# Patient Record
(demographics unavailable — no encounter records)

---

## 2024-12-31 NOTE — PHYSICAL EXAM
[Chaperone Present] : A chaperone was present in the examining room during all aspects of the physical examination [[id number]] : A chaperone was present during the pelvic exam. [Appropriately responsive] : appropriately responsive [Alert] : alert [No Acute Distress] : no acute distress [Oriented x3] : oriented x3

## 2024-12-31 NOTE — PHYSICAL EXAM
[Chaperone Present] : A chaperone was present in the examining room during all aspects of the physical examination [[id number]] : A chaperone was present during the pelvic exam. [Appropriately responsive] : appropriately responsive [Alert] : alert [No Acute Distress] : no acute distress [Oriented x3] : oriented x3

## 2025-01-07 NOTE — HISTORY OF PRESENT ILLNESS
[Patient reported PAP Smear was normal] : Patient reported PAP Smear was normal [N] : Patient does not use contraception [Y] : Positive pregnancy history [Menarche Age: ____] : age at menarche was [unfilled] [Currently Active] : currently active [Men] : men [No] : No [Mammogramdate] : 3/26/2024 [TextBox_19] : BR 1 [PapSmeardate] : 10/21/2024 [ColonoscopyDate] : 9/28/2022 [HPVDate] : 3/26/2024 [TextBox_78] : Pt report normal [LMPDate] : 12/11/2024 [PGHxTotal] : 3 [[facility] : [facility] [[facility]] : 3 [FreeTextEntry1] : 12/11/2024

## 2025-01-07 NOTE — PLAN
[FreeTextEntry1] : We discussed in detail her PMDD and perimenopausal symptoms and how they affect her daily quality of life. We reviewed the risks of untreated vasomotor symptoms and diminished sleep as a cardiac risk factor. Her medical history was reviewed in detail. There are no contraindications to menopausal hormone therapy. We discussed systemic menopause hormone therapy, as well as nonhormonal treatment options for her menopausal symptoms, although she is on and has tried many nonhormonal medications.  She is aware that systemic menopausal hormone therapy is FDA approved and is recommended for women within 10 years of menopause and less than 60 years old with vasomotor symptoms and other menopausal symptoms.  Given the fact that she has a uterus, we discussed the need for both estrogen and progesterone therapy. By taking progesterone as well, this will reduce her risk of endometrial hyperplasia and endometrial cancer. We discussed the rare risks of menopausal hormone therapy including a rare increase in breast cancer (approx 1 case per 1000), rare increase in cardiovascular disease (approx 2.5 cases per 1000), rare increase in stroke (approx 2.5 cases per 1000), and a rare increase in pulmonary embolism (approx 3 cases per 1000). I also explained that there is actually a small reduction of all cause mortality overall (approx 5 fewer cases per 1000).  Side effects may be encountered for the first 3-6 months of systemic menopausal hormone therapy initiation. She is aware of the potential side effects including breast tenderness, mood changes, bloating, and abnormal vaginal bleeding. She is aware that if she has any abnormal bleeding that persists after 3-6 months, she must return for a uterine evaluation.  We discussed the different formulations of hormones and routes of estrogen delivery via transdermal routes (patches, gels, sprays), transvaginal formulations via a Femring and oral routes. We discussed the benefits and risks of each different routes individually. We also discussed the different routes of progesterone delivery including transdermal via patch, oral or intrauterine routes (Mirena or Kyleen IUD).  After an extensive discussion, the patient has concluded that the benefits of systemic menopausal hormone therapy outweigh the risks and through a shared decision-making process, the patient has decided to initiate menopausal hormone therapy. My recommendation is to start with transdermal or transvaginal estrogen routes as there may likely be less risks than oral therapy. She is aware it can take 3 months to have relief of symptoms. She was instructed to follow up in the office in 3-4 months. Prescription sent for an estrogen patch weekly 0.075 mg for the patient to use daily with the progesterone 100 mg which she will use nightly.   We also discussed the diagnosis of hypoactive sexual desire disorder. In her case, she does not have vaginal symptoms or pain as a cause for her decreased libido. We discussed the 3 treatment options for decreased libido in women, Flibanserin (Addyi) , Bremelanotide (Vyleesi), and testosterone therapy. I explained that both Flibanserin and Bremelanotide are FDA approved for use in premenopausal women and not postmenopausal women. If we used either of these in a postmenopausal woman, it would be off label.  I explained the risks, benefits, side effects and cost of the above. She has decided to pursue a trial of testosterone therapy. We discussed the need for baseline testosterone levels, in addition to CBC, cholesterol panel, and LFTs. I explained that testosterone therapy is not FDA approved for women and it is used off label. Since there are no FDA approved testosterone products for women, my preference is to use a compounded pharmacy. We discussed that it helps 50% of women. We discussed significant potential side effects that could be irreversible including acne, hair loss, voice changes. She will return in 3 months for repeat testosterone levels. Rx sent to [facility].  Questions answered.  Pt will follow up annually and as needed.   Pt's testosterone levels will be checked.   During this visit 60 minutes were spent face-to-face with greater than 50% of the time dedicated to discussing her perimenopausal /menopausal symptoms and treatment options.

## 2025-01-07 NOTE — REVIEW OF SYSTEMS
[Negative] : Breast [Fatigue] : fatigue [Night Sweats] : night sweats [Abn Vaginal bleeding] : no abnormal vaginal bleeding [Genital Rash/Irritation] : no genital rash/irritation [Anxiety] : anxiety [Sleep Disturbances] : sleep disturbances [Decreased Libido] : decreased libido [PMS/PMDD Symptoms] : PMS/PMDD symptoms

## 2025-01-07 NOTE — PLAN
[FreeTextEntry1] : We discussed in detail her PMDD and perimenopausal symptoms and how they affect her daily quality of life. We reviewed the risks of untreated vasomotor symptoms and diminished sleep as a cardiac risk factor. Her medical history was reviewed in detail. There are no contraindications to menopausal hormone therapy. We discussed systemic menopause hormone therapy, as well as nonhormonal treatment options for her menopausal symptoms, although she is on and has tried many nonhormonal medications.  She is aware that systemic menopausal hormone therapy is FDA approved and is recommended for women within 10 years of menopause and less than 60 years old with vasomotor symptoms and other menopausal symptoms.  Given the fact that she has a uterus, we discussed the need for both estrogen and progesterone therapy. By taking progesterone as well, this will reduce her risk of endometrial hyperplasia and endometrial cancer. We discussed the rare risks of menopausal hormone therapy including a rare increase in breast cancer (approx 1 case per 1000), rare increase in cardiovascular disease (approx 2.5 cases per 1000), rare increase in stroke (approx 2.5 cases per 1000), and a rare increase in pulmonary embolism (approx 3 cases per 1000). I also explained that there is actually a small reduction of all cause mortality overall (approx 5 fewer cases per 1000).  Side effects may be encountered for the first 3-6 months of systemic menopausal hormone therapy initiation. She is aware of the potential side effects including breast tenderness, mood changes, bloating, and abnormal vaginal bleeding. She is aware that if she has any abnormal bleeding that persists after 3-6 months, she must return for a uterine evaluation.  We discussed the different formulations of hormones and routes of estrogen delivery via transdermal routes (patches, gels, sprays), transvaginal formulations via a Femring and oral routes. We discussed the benefits and risks of each different routes individually. We also discussed the different routes of progesterone delivery including transdermal via patch, oral or intrauterine routes (Mirena or Kyleen IUD).  After an extensive discussion, the patient has concluded that the benefits of systemic menopausal hormone therapy outweigh the risks and through a shared decision-making process, the patient has decided to initiate menopausal hormone therapy. My recommendation is to start with transdermal or transvaginal estrogen routes as there may likely be less risks than oral therapy. She is aware it can take 3 months to have relief of symptoms. She was instructed to follow up in the office in 3-4 months. Prescription sent for an estrogen patch weekly 0.075 mg for the patient to use daily with the progesterone 100 mg which she will use nightly.   We also discussed the diagnosis of hypoactive sexual desire disorder. In her case, she does not have vaginal symptoms or pain as a cause for her decreased libido. We discussed the 3 treatment options for decreased libido in women, Flibanserin (Addyi) , Bremelanotide (Vyleesi), and testosterone therapy. I explained that both Flibanserin and Bremelanotide are FDA approved for use in premenopausal women and not postmenopausal women. If we used either of these in a postmenopausal woman, it would be off label.  I explained the risks, benefits, side effects and cost of the above. She has decided to pursue a trial of testosterone therapy. We discussed the need for baseline testosterone levels, in addition to CBC, cholesterol panel, and LFTs. I explained that testosterone therapy is not FDA approved for women and it is used off label. Since there are no FDA approved testosterone products for women, my preference is to use a compounded pharmacy. We discussed that it helps 50% of women. We discussed significant potential side effects that could be irreversible including acne, hair loss, voice changes. She will return in 3 months for repeat testosterone levels. Rx sent to [facility].  Questions answered.  Pt will follow up annually and as needed.   Pt's testosterone levels will be checked.   During this visit 60 minutes were spent face-to-face with greater than 50% of the time dedicated to discussing her perimenopausal /menopausal symptoms and treatment options.

## 2025-01-07 NOTE — HISTORY OF PRESENT ILLNESS
[Patient reported PAP Smear was normal] : Patient reported PAP Smear was normal [N] : Patient does not use contraception [Y] : Positive pregnancy history [Menarche Age: ____] : age at menarche was [unfilled] [Currently Active] : currently active [Men] : men [No] : No [Mammogramdate] : 3/26/2024 [TextBox_19] : BR 1 [PapSmeardate] : 10/21/2024 [ColonoscopyDate] : 9/28/2022 [HPVDate] : 3/26/2024 [TextBox_78] : Pt report normal [LMPDate] : 12/11/2024 [PGHxTotal] : 3 [[facility]] : 3 [[facility] Center[facility]] : 3 [FreeTextEntry1] : 12/11/2024

## 2025-01-07 NOTE — END OF VISIT
[FreeTextEntry3] : "I, Ramiro Diaz, personally scribed the services dictated to me by Dr. Lexus Savage MD in this documentation on 12/31/2024"   "I, Dr. Lexus Savage MD, personally performed the services described in this documentation on 12/31/2024 for the patient as scribed by Ramiro Diaz in my presence. I have reviewed and verified that all the information is accurate and true." [Time Spent: ___ minutes] : I have spent [unfilled] minutes of time on the encounter which excludes teaching and separately reported services.

## 2025-03-06 NOTE — CONSULT LETTER
[Dear  ___] : Dear  [unfilled], [Consult Letter:] : I had the pleasure of evaluating your patient, [unfilled]. [Please see my note below.] : Please see my note below. [Sincerely,] : Sincerely, [FreeTextEntry3] : Dr. Rodger Shipman

## 2025-03-06 NOTE — PROCEDURE
[de-identified] : Injection: US guided Left Elbow Indication: Lateral epicondylitis  A discussion was had with the patient regarding this procedure and all questions were answered. All risks, benefits, and alternatives were discussed. These included but were not limited to bleeding, infection, and allergic reaction. Alcohol was used to clean the skin, and ChloraPrep was used to sterilize and prep the area in the lateral aspect of the left elbow. Ethyl chloride spray was then used as a topical anesthetic. A 25-gauge needle was used to inject 1 cc of 0.25% bupivacaine, 1 cc of 1% xylocaine, and 1 cc of 40 mg/mL triamcinolone acetonide into the extensor tendon origin. A gentle needling procedure was performed. Ultrasound guidance was utilized for localization of the needle. A sterile band aid was then applied. The patient tolerated the procedure well and there were no complications. Post injection instructions were given.

## 2025-03-06 NOTE — DISCUSSION/SUMMARY
[de-identified] : We had a thorough discussion regarding the nature of her pain, the pathophysiology, as well as all treatment options. I discussed operative and non-operative treatment modalities. Patients presentation is consistent with lateral epicondylitis. Pt due to her acute pain elected for a corticosteroid injection at today's visit and tolerated the procedure well. She should take it easy for the next 2-3 days while icing the joint. A prescription of Meloxicam was given to be taken as directed with food to prevent GI upset, if occurs pt to D/C and call us at that time. All questions were answered and the patient verbalized understanding. The patient is in agreement with this treatment plan. Patient will follow up in 6-8 wks for repeat clinical assessment.

## 2025-03-06 NOTE — ADDENDUM
[FreeTextEntry1] : Documented by Montse Hodge acting as a scribe for Dr. Shipman and Shayne Hopkins PA-C on 03/06/2025. All medical record entries made by the Scribe were at my, Dr. Shipman, and Shayne Hopkins's, direction and personally dictated by me on 03/06/2025. I have reviewed the chart and agree that the record accurately reflects my personal performance of the history, physical exam, procedure and imaging.

## 2025-03-06 NOTE — PROCEDURE
[de-identified] : Injection: US guided Left Elbow Indication: Lateral epicondylitis  A discussion was had with the patient regarding this procedure and all questions were answered. All risks, benefits, and alternatives were discussed. These included but were not limited to bleeding, infection, and allergic reaction. Alcohol was used to clean the skin, and ChloraPrep was used to sterilize and prep the area in the lateral aspect of the left elbow. Ethyl chloride spray was then used as a topical anesthetic. A 25-gauge needle was used to inject 1 cc of 0.25% bupivacaine, 1 cc of 1% xylocaine, and 1 cc of 40 mg/mL triamcinolone acetonide into the extensor tendon origin. A gentle needling procedure was performed. Ultrasound guidance was utilized for localization of the needle. A sterile band aid was then applied. The patient tolerated the procedure well and there were no complications. Post injection instructions were given.

## 2025-03-06 NOTE — HISTORY OF PRESENT ILLNESS
[de-identified] : JORDAN is a 48 year female here today for initial visit due to L elbow and L shoulder. States it has been causing her pain for about two years. She was doing a lateral raise in Oct 2024 and felt a pull. She ignored it for a while and modified. She then tried lifting lighter weights and using a tens unit and also acupuncture without much relief. She stopped lifting weights about three weeks ago and could no longer  or grab without pain. She is also having difficulty with ADLs. She has had to stop carrying groceries in her L hand due to her pain. She has been utilizing Meloxicam with some relief to her symptoms.

## 2025-03-06 NOTE — DISCUSSION/SUMMARY
[de-identified] : We had a thorough discussion regarding the nature of her pain, the pathophysiology, as well as all treatment options. I discussed operative and non-operative treatment modalities. Patients presentation is consistent with lateral epicondylitis. Pt due to her acute pain elected for a corticosteroid injection at today's visit and tolerated the procedure well. She should take it easy for the next 2-3 days while icing the joint. A prescription of Meloxicam was given to be taken as directed with food to prevent GI upset, if occurs pt to D/C and call us at that time. All questions were answered and the patient verbalized understanding. The patient is in agreement with this treatment plan. Patient will follow up in 6-8 wks for repeat clinical assessment.

## 2025-03-06 NOTE — PHYSICAL EXAM
[de-identified] : General: Well appearing, no acute distress Neurologic: A&Ox3, No focal deficits Head: NCAT without abrasions, lacerations, or ecchymosis to head, face, or scalp Eyes: No scleral icterus, no gross abnormalities Respiratory: Equal chest wall expansion bilaterally, no accessory muscle use Lymphatic: No lymphadenopathy palpated Skin: Warm and dry Psychiatric: Normal mood and affect  Left elbow shows no obvious deformity swelling or ecchymosis. There is tenderness to palpation over the lateral epicondyle. No tenderness over the medial epicondyle. Elbow ROM 0-120 with pain. Full ROM with pronation and supination with pain. Pain upon resisted elbow supination. No instability to valgus or varus stress in the elbow. Pain with resisted wrist extension with simultaneous elbow extension. Negative Tinel sign at the cubital tunnel and carpal tunnel. Compartments soft and nontender. Sensation intact distally. 2+ cap refill.  Examination of the left shoulder reveals no obvious deformity.  There is no evidence of muscle atrophy.  The skin is intact.  There is no swelling, erythema, or ecchymosis.  The patient is nontender to palpation throughout the left shoulder including the anterior aspect of the shoulder near the bicipital groove, a.c. joint, trapezius and posterior shoulder.  The patient's active range of motion is as follows: Forward flexion to 175 degrees external rotation to 75 degrees and internal rotation to an upper lumbar level.  The patient has 5 out of 5 strength to forward flexion with pronation, internal and external rotation. 5/5 strength to abduction with pain.  The patient has POS Robledo and Neer's tests.  There is no pain with cross body abduction testing.  There is a negative a.c. compression test and no sulcus sign present.  There is a negative liftoff test and negative yergason test.  The compartments of the arm are soft and nontender without evidence of DVT or compartment syndrome.  The patient is grossly neurovascularly intact distally. [de-identified] : The following radiographs were ordered and read by me during this patient's visit. I reviewed each radiograph in detail with the patient and discussed the findings as highlighted below. 3 views of the left elbow and 4 views of the left shoulder were obtained today that show no fracture, dislocation. There is no degenerative change seen. There is no malalignment. No obvious osseous abnormality. Otherwise unremarkable.

## 2025-03-06 NOTE — PHYSICAL EXAM
[de-identified] : General: Well appearing, no acute distress Neurologic: A&Ox3, No focal deficits Head: NCAT without abrasions, lacerations, or ecchymosis to head, face, or scalp Eyes: No scleral icterus, no gross abnormalities Respiratory: Equal chest wall expansion bilaterally, no accessory muscle use Lymphatic: No lymphadenopathy palpated Skin: Warm and dry Psychiatric: Normal mood and affect  Left elbow shows no obvious deformity swelling or ecchymosis. There is tenderness to palpation over the lateral epicondyle. No tenderness over the medial epicondyle. Elbow ROM 0-120 with pain. Full ROM with pronation and supination with pain. Pain upon resisted elbow supination. No instability to valgus or varus stress in the elbow. Pain with resisted wrist extension with simultaneous elbow extension. Negative Tinel sign at the cubital tunnel and carpal tunnel. Compartments soft and nontender. Sensation intact distally. 2+ cap refill.  Examination of the left shoulder reveals no obvious deformity.  There is no evidence of muscle atrophy.  The skin is intact.  There is no swelling, erythema, or ecchymosis.  The patient is nontender to palpation throughout the left shoulder including the anterior aspect of the shoulder near the bicipital groove, a.c. joint, trapezius and posterior shoulder.  The patient's active range of motion is as follows: Forward flexion to 175 degrees external rotation to 75 degrees and internal rotation to an upper lumbar level.  The patient has 5 out of 5 strength to forward flexion with pronation, internal and external rotation. 5/5 strength to abduction with pain.  The patient has POS Robledo and Neer's tests.  There is no pain with cross body abduction testing.  There is a negative a.c. compression test and no sulcus sign present.  There is a negative liftoff test and negative yergason test.  The compartments of the arm are soft and nontender without evidence of DVT or compartment syndrome.  The patient is grossly neurovascularly intact distally. [de-identified] : The following radiographs were ordered and read by me during this patient's visit. I reviewed each radiograph in detail with the patient and discussed the findings as highlighted below. 3 views of the left elbow and 4 views of the left shoulder were obtained today that show no fracture, dislocation. There is no degenerative change seen. There is no malalignment. No obvious osseous abnormality. Otherwise unremarkable.

## 2025-03-06 NOTE — HISTORY OF PRESENT ILLNESS
[de-identified] : JORDAN is a 48 year female here today for initial visit due to L elbow and L shoulder. States it has been causing her pain for about two years. She was doing a lateral raise in Oct 2024 and felt a pull. She ignored it for a while and modified. She then tried lifting lighter weights and using a tens unit and also acupuncture without much relief. She stopped lifting weights about three weeks ago and could no longer  or grab without pain. She is also having difficulty with ADLs. She has had to stop carrying groceries in her L hand due to her pain. She has been utilizing Meloxicam with some relief to her symptoms.

## 2025-04-09 NOTE — PLAN
[FreeTextEntry1] : Pt is doing well on 0.075 MG estradiol weekly and 100 MG of oral progesterone nightly. Continue as prescribed. Prescription renewals for transdermal estradiol 0.075 MG & 100 MG of oral progesterone.   Pt is doing well on testosterone therapy. Will recheck level on today. If level is stable today, we repeat level in two months. Prescription renewal for testosterone given.   F/u annually or as needed.

## 2025-04-09 NOTE — END OF VISIT
[FreeTextEntry3] : I, Keira Fischer solely acted as scribe for Dr. Lexus Savage on 04/07/2025  All medical entries made by the Scribe were at my, Dr. Olivera, direction and personally dictated by me on 04/07/2025 . I have reviewed the chart and agree that the record accurately reflects my personal performance of the history, physical exam, assessment and plan. I have also personally directed, reviewed, and agreed with the chart.

## 2025-04-09 NOTE — REVIEW OF SYSTEMS
[Night Sweats] : no night sweats [Sleep Disturbances] : no sleep disturbances [Decreased Libido] : libido not decreased [Hot Flashes] : no hot flashes

## 2025-04-09 NOTE — HISTORY OF PRESENT ILLNESS
[Mammogramdate] : 04/01/2025 [TextBox_19] : bilateral br1 [PapSmeardate] : 05/21/2021 [TextBox_31] : neg  [BoneDensityDate] : n/a [ColonoscopyDate] : 09/28/2022 [TextBox_43] : 2 polyps found, internal hemorrhoid  [GonorrheaDate] : n/a [ChlamydiaDate] : n/a [HPVDate] : 12/02/2014 [TextBox_78] : neg  [LMPDate] : 04/06/2025 [PGHxTotal] : 3 [[facility]] : 3 [[facility] Corporationiving] : 3 [FreeTextEntry1] : 04/06/2025 [FreeTextEntry2] :

## 2025-04-09 NOTE — HISTORY OF PRESENT ILLNESS
[Mammogramdate] : 04/01/2025 [TextBox_19] : bilateral br1 [PapSmeardate] : 05/21/2021 [TextBox_31] : neg  [BoneDensityDate] : n/a [ColonoscopyDate] : 09/28/2022 [TextBox_43] : 2 polyps found, internal hemorrhoid  [GonorrheaDate] : n/a [ChlamydiaDate] : n/a [HPVDate] : 12/02/2014 [TextBox_78] : neg  [LMPDate] : 04/06/2025 [PGHxTotal] : 3 [[facility] CenterlTerm] : 3 [[facility] Centeriving] : 3 [FreeTextEntry1] : 04/06/2025 [FreeTextEntry2] :

## 2025-05-19 NOTE — HISTORY OF PRESENT ILLNESS
[FreeTextEntry1] : Post ER follow-up [de-identified] : Follow-up after patient presented to the ER at [facility] on May 15, 2025 complaining of dizziness. It was intermittent for 4 days with heaviness in her head and feeling like she was going to pass out. Worsening over the 4 days. Worsened to the point where she could not work therefore came to the ER. Exam showed no focal deficits. Workup done CT scan of the brain with no acute changes. CTA of the neck and brain showed no evidence of stenoses, dissection or occlusions. CT cervical spine with no acute changes with disc bulges noted at several levels. Patient felt to have vertigo and given meclizine with improvement. Stable for discharge with meclizine prescription.  The patient now relates that she did not recall at that time but later recalled when she got home that she had a fairly significant head trauma on a cabinet on May 8 which caused her to almost feel like she was going to pass out and unsteady which lasted a few minutes but then went back to her job.  After which she did not feel well for the next few days and then the symptoms started.  She continues to have symptoms including dizziness, decreased focus, dizziness when on the computer for more than 10 to 15 minutes, dizziness when using an elevator and mild chronic headache. 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

## 2025-05-19 NOTE — ASSESSMENT
[FreeTextEntry1] : Patient was status post head trauma with resultant persistent symptoms including dizziness, decreased ability to focus, dizziness with being on a computer, mild chronic headache. Patient likely has concussive symptoms. Recommended avoiding any stresses in things that exacerbate her symptoms.  Rest is much as possible.  No strenuous activity or exercises. Referral given for neurology.

## 2025-05-19 NOTE — HISTORY OF PRESENT ILLNESS
[FreeTextEntry1] : Post ER follow-up [de-identified] : Follow-up after patient presented to the ER at [facility] on May 15, 2025 complaining of dizziness. It was intermittent for 4 days with heaviness in her head and feeling like she was going to pass out. Worsening over the 4 days. Worsened to the point where she could not work therefore came to the ER. Exam showed no focal deficits. Workup done CT scan of the brain with no acute changes. CTA of the neck and brain showed no evidence of stenoses, dissection or occlusions. CT cervical spine with no acute changes with disc bulges noted at several levels. Patient felt to have vertigo and given meclizine with improvement. Stable for discharge with meclizine prescription.  The patient now relates that she did not recall at that time but later recalled when she got home that she had a fairly significant head trauma on a cabinet on May 8 which caused her to almost feel like she was going to pass out and unsteady which lasted a few minutes but then went back to her job.  After which she did not feel well for the next few days and then the symptoms started.  She continues to have symptoms including dizziness, decreased focus, dizziness when on the computer for more than 10 to 15 minutes, dizziness when using an elevator and mild chronic headache. 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

## 2025-05-19 NOTE — PHYSICAL EXAM
[No Acute Distress] : no acute distress [No Respiratory Distress] : no respiratory distress  [No Accessory Muscle Use] : no accessory muscle use [Clear to Auscultation] : lungs were clear to auscultation bilaterally [Normal Rate] : normal rate  [Regular Rhythm] : with a regular rhythm [Coordination Grossly Intact] : coordination grossly intact [No Focal Deficits] : no focal deficits [Normal Gait] : normal gait [Speech Grossly Normal] : speech grossly normal [Memory Grossly Normal] : memory grossly normal [Normal Affect] : the affect was normal [Alert and Oriented x3] : oriented to person, place, and time [Normal Mood] : the mood was normal

## 2025-05-23 NOTE — REVIEW OF SYSTEMS
[Dizziness] : dizziness [Lightheadedness] : lightheadedness [Cluster Headache] : cluster headaches [Limb Pain] : limb pain [Negative] : Endocrine

## 2025-05-23 NOTE — PHYSICAL EXAM
[General Appearance - Alert] : alert [General Appearance - Well Nourished] : well nourished [General Appearance - Well-Appearing] : healthy appearing [Oriented To Time, Place, And Person] : oriented to person, place, and time [Affect] : the affect was normal [Person] : oriented to person [Place] : oriented to place [Time] : oriented to time [Short Term Intact] : short term memory intact [Cranial Nerves Optic (II)] : visual acuity intact bilaterally,  pupils equal round and reactive to light [Cranial Nerves Oculomotor (III)] : extraocular motion intact [Cranial Nerves Trigeminal (V)] : facial sensation intact symmetrically [Cranial Nerves Facial (VII)] : face symmetrical [Cranial Nerves Vestibulocochlear (VIII)] : hearing was intact bilaterally [Cranial Nerves Accessory (XI - Cranial And Spinal)] : head turning and shoulder shrug symmetric [Motor Tone] : muscle tone was normal in all four extremities [Motor Strength] : muscle strength was normal in all four extremities [Sensation Tactile Decrease] : light touch was intact [Abnormal Walk] : normal gait [Sclera] : the sclera and conjunctiva were normal [PERRL With Normal Accommodation] : pupils were equal in size, round, reactive to light, with normal accommodation [Outer Ear] : the ears and nose were normal in appearance [Neck Appearance] : the appearance of the neck was normal [] : no respiratory distress [Heart Rate And Rhythm] : heart rate was normal and rhythm regular [Abdomen Soft] : soft [No Spinal Tenderness] : no spinal tenderness [Involuntary Movements] : no involuntary movements were seen [FreeTextEntry8] : no drift, no imbalance at tandem

## 2025-05-23 NOTE — RESULTS/DATA
[FreeTextEntry1] : ACC: [id number] EXAM: CT ANGIO NECK (W)AW IC ORDERED BY: ISA YING  ACC: [id number] EXAM: CT BRAIN ORDERED BY: ISA YING  ACC: [id number] EXAM: CT CERVICAL SPINE ORDERED BY: ISA YING  ACC: [id number] EXAM: CT ANGIO BRAIN (W)AW IC ORDERED BY: ISA YING  PROCEDURE DATE: 05/15/2025    INTERPRETATION: INDICATION: Dizziness  TECHNIQUE: A thin section CT study of the head and neck was conducted during rapid infusion of intravenous contrast (power injected). In addition to the axial data, reformatted images were generated using a 3D workstation. Rapid AI was used to screen for hemorrhage. 100 cc Omnipaque 350 was administered.  FINDINGS:  AORTIC ARCH no dissection or aneurysmal dilatation is noted. Major vessel origins are patent. The subclavian arteries are well visualized and appear to be patent bilaterally. COMMON CAROTID ARTERIES: Laterally widely patent RIGHT BIFURCATION: Widely patent LEFT BIFURCATION: Widely patent INTERNAL CAROTID ARTERIES: Bilaterally widely patent VERTEBRALS bilaterally well developed and widely patent with left-sided dominance. No ostial lesion noted. MISCELLANEOUS: Prevertebral soft tissues are unremarkable.   BRAIN both hemispheres appear to be symmetric with no CT evidence of an acute infarct, hemorrhage, or space-occupying lesion. No hydrocephalus or collections noted. Brainstem and cerebellum are unremarkable. Sinuses and mastoids are clear.  The petrous, cavernous, and supraclinoid carotid arteries are bilaterally patent   Both anterior cerebral arteries are patent. Both A1 segments are well formed.  Both middle cerebral arteries are patent. No M1 lesion or distal branch occlusion suggested.  Posterior cerebral arteries are patent.  Vertebrobasilar system is widely patent.  The venous sinuses are patent.  CERVICAL FINDINGS:  ALIGNMENT: Mild straightening is noted. VERTEBRAL BODIES: Appear to be intact. No fracture or compression deformity noted. No destructive lesion identified. DISC SPACES: Result in height. C1-2 : Unremarkable C2-3: Unremarkable C3-4: Mild bulging is noted C4-5: Unremarkable C5-6: Mild bulging is noted more prevalent to the left C6-7: A a central bulge is noted more prevalent to the left C7-T1: Unremarkable CANAL: Widely patent FORAMINA: Bilaterally widely patent. MISCELLANEOUS: Upper thoracic canal is grossly unremarkable.  IMPRESSION:  1. Unremarkable CT study of the brain. No acute infarct, hemorrhage, or space-occupying lesion. No posterior fossa abnormality. Clear sinuses and mastoids. 2. Unremarkable appearance of the aortic arch, major vessels, and neck vasculature. No significant stenosis, dissection, or occlusion. Well-developed vertebral arteries bilaterally. 3. Widely patent vasculature noted intracranially. Widely patent vertebrobasilar system. 4. Mild cervical straightening. No vertebral body fracture or acute disc herniation identified. Disc bulges are noted at several levels, as outlined above. No central or foraminal stenosis noted.

## 2025-05-23 NOTE — HISTORY OF PRESENT ILLNESS
[FreeTextEntry1] : Ms Burgos is a 48 years old female with no PMH of who is here for consultation regarding a concussion. On  5/8/25 she hit her head to a cabinet at work. 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 No LOC noted. Pt was disoriented for some time. Pt was continued to have headache from the time. Pt was seen in the ER and was seen in 5/15/25 and had many imaging. She was treated conservatively and discharged home with instruction to follow up Trauma/ concussion clinic.   Today she presented to clinic with mild headache and fogginess. She feels disoriented most of the time. He denies HA, vision changes, nausea, vomiting or seizures. Pt went back to work ,but had to leave the work couple time with worsening headache. Pt feels very fatigued and disoriented at work. She takes Magnesium blend and Vitamins for suzanna menopausal symptoms. Pt also walks 5-6 miles a day.  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

## 2025-05-23 NOTE — ASSESSMENT
[Indicate if, in your opinion, the incident that the patient described was the competent medical cause of this injury/illness.] : The incident that the patient described was the competent medical cause of this injury/illness: Yes [Indicate if the patient's complaints are consistent with his/her history of the injury/illness.] : Indicate if the patient's complaints are consistent with his/her history of the injury/illness: Yes [FreeTextEntry5] : 100% [Physical Disability Temporary Total] : temporarily total disabled [FreeTextEntry1] : IMPRESSION  48 years old female with no PMH of who is here for consultation regarding a concussion. On 5/8/25 she hit her head to a cabinet at work. 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 Pt was disoriented for some time. Pt was continued to have headache from the time. Pt was seen in the ER and was seen in 5/15/25 and had many imaging.  She presented to clinic with mild headache and fogginess. She feels disoriented most of the time. He denies HA, vision changes, nausea, vomiting or seizures. Pt went back to work ,but had to leave the work couple time with worsening headache.   PLAN  Tab Vitamin B2 400 mg daily Tab Magnesium Glycinate 400 mg Daily Tab Co Q 10 300mg daily Encouraged aerobic exercises Pt would benefit from time off from work to help alleviate symptoms.  Plan for one week off and reassess in one week. F/U in 1 weeks TEB.

## 2025-05-29 NOTE — HISTORY OF PRESENT ILLNESS
[FreeTextEntry1] : Ms Burgos is a 48 year old female with no PMH of who is here for consultation regarding a concussion. On 5/8/25 she hit her head to a cabinet at work. 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 Pt was disoriented for some time. Pt was continued to have headache from the time. Pt was seen in the ER and was seen in 5/15/25 and had many imaging. She was treated conservatively and discharged home with instruction to follow up Trauma/ concussion clinic. Pt was seen in the clinic and was placed off work.  Today she presented via TEB to clinic with almost resolving symptoms. Her HA has resolved, feels some pressure like feeling at times.   He denies HA, vision changes, nausea, vomiting or seizures. Pt is taking all supplements as instructed. She also reports that her dizziness has resolved. Pt also walks 5-6 miles a day. Over all she feels 95% better.     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 [No] : The patient is currently not working.

## 2025-05-29 NOTE — HISTORY OF PRESENT ILLNESS
[FreeTextEntry1] : Ms Burgos is a 48 year old female with no PMH of who is here for consultation regarding a concussion. On 5/8/25 she hit her head to a cabinet at work. 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 Pt was disoriented for some time. Pt was continued to have headache from the time. Pt was seen in the ER and was seen in 5/15/25 and had many imaging. She was treated conservatively and discharged home with instruction to follow up Trauma/ concussion clinic. Pt was seen in the clinic and was placed off work.  Today she presented via TEB to clinic with almost resolving symptoms. Her HA has resolved, feels some pressure like feeling at times.   He denies HA, vision changes, nausea, vomiting or seizures. Pt is taking all supplements as instructed. She also reports that her dizziness has resolved. Pt also walks 5-6 miles a day. Over all she feels 95% better.     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 [No] : The patient is currently not working.

## 2025-05-29 NOTE — ASSESSMENT
[Indicate if, in your opinion, the incident that the patient described was the competent medical cause of this injury/illness.] : The incident that the patient described was the competent medical cause of this injury/illness: Yes [Indicate if the patient's complaints are consistent with his/her history of the injury/illness.] : Indicate if the patient's complaints are consistent with his/her history of the injury/illness: Yes [Yes] : Yes, it is consistent [Can the patient return to usual work activities as indicated? If yes, indicate date___] : The patient can return to usual work activities on [unfilled] [Are there any work limitations? (If so, explain and quantify, including the anticipated duration of the limitations)] : There are no work limitations.  [FreeTextEntry5] : 0 [FreeTextEntry1] : IMPRESSION  48 years old female with no PMH of who is here for consultation regarding a concussion. On 5/8/25 she hit her head to a cabinet at work. 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 Pt was disoriented for some time. Pt was continued to have headache from the time. Pt was seen in the ER and was seen in 5/15/25 and had many imaging. She presented to clinic a week ago with mild headache and fogginess. She was advised supplements and aerobic exercises. Today she feels well with almost resolved HA and dizziness. Pt ready to go back to work FT. Pt 0% disabled at this time.   PLAN  Continue Tab Vitamin B2 400 mg daily Continue Tab Magnesium Glycinate 400 mg Daily Continue Tab Co Q 10 300mg daily Encouraged aerobic exercises Cleared to go back to work as of 5/30/25 F/U in 3 weeks advised, pt would like to f/u as needed.  TTM - 15 min

## 2025-05-29 NOTE — REASON FOR VISIT
[Follow-Up Visit] : a follow-up visit for [Home] : at home, [unfilled] , at the time of the visit. [Medical Office: ([facility])___] : at the medical office located in  [Telephone (audio)] : This telephonic visit was provided via audio only technology. [Technical] : patient unable to effectively utilize tele-video due to technical issues. [Verbal consent obtained from patient] : the patient, [unfilled] [FreeTextEntry2] : post concussion symptoms

## 2025-05-29 NOTE — ASSESSMENT
[Indicate if, in your opinion, the incident that the patient described was the competent medical cause of this injury/illness.] : The incident that the patient described was the competent medical cause of this injury/illness: Yes [Indicate if the patient's complaints are consistent with his/her history of the injury/illness.] : Indicate if the patient's complaints are consistent with his/her history of the injury/illness: Yes [Yes] : Yes, it is consistent [Can the patient return to usual work activities as indicated? If yes, indicate date___] : The patient can return to usual work activities on [unfilled] [Are there any work limitations? (If so, explain and quantify, including the anticipated duration of the limitations)] : There are no work limitations.  [FreeTextEntry5] : 0 [FreeTextEntry1] : IMPRESSION  48 years old female with no PMH of who is here for consultation regarding a concussion. On 5/8/25 she hit her head to a cabinet at work. 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 Pt was disoriented for some time. Pt was continued to have headache from the time. Pt was seen in the ER and was seen in 5/15/25 and had many imaging. She presented to clinic a week ago with mild headache and fogginess. She was advised supplements and aerobic exercises. Today she feels well with almost resolved HA and dizziness. Pt ready to go back to work FT. Pt 0% disabled at this time.   PLAN  Continue Tab Vitamin B2 400 mg daily Continue Tab Magnesium Glycinate 400 mg Daily Continue Tab Co Q 10 300mg daily Encouraged aerobic exercises Cleared to go back to work as of 5/30/25 F/U in 3 weeks advised, pt would like to f/u as needed.  TTM - 15 min

## 2025-05-29 NOTE — REASON FOR VISIT
[Follow-Up Visit] : a follow-up visit for [Home] : at home, [unfilled] , at the time of the visit. [Medical Office: ([facility])___] : at the medical office located in  [Telephone (audio)] : This telephonic visit was provided via audio only technology. [Technical] : patient unable to effectively utilize tele-video due to technical issues. [Verbal consent obtained from patient] : the patient, [unfilled] [FreeTextEntry2] : post concussion symptoms